# Patient Record
Sex: MALE | Race: WHITE | URBAN - METROPOLITAN AREA
[De-identification: names, ages, dates, MRNs, and addresses within clinical notes are randomized per-mention and may not be internally consistent; named-entity substitution may affect disease eponyms.]

---

## 2020-07-27 ENCOUNTER — IMPORTED ENCOUNTER (OUTPATIENT)
Dept: URBAN - METROPOLITAN AREA CLINIC 1 | Facility: CLINIC | Age: 65
End: 2020-07-27

## 2020-07-27 PROBLEM — H52.03: Noted: 2020-07-27

## 2020-07-27 PROBLEM — H52.4: Noted: 2020-07-27

## 2020-07-27 PROCEDURE — S0620 ROUTINE OPHTHALMOLOGICAL EXA: HCPCS

## 2020-07-27 NOTE — PATIENT DISCUSSION
1.  Hyperopia: Rx was given for corrective spectacles if indicated. 2.  Presbyopia 3. Cataract OU - Observe 4. Dry Eyes w/ PEK OU - ATs BID OU 5. Allergic Conjunctivitis OU - OTC Zaditor BID OU PRN for itchingReturn for an appointment in 1 year 36 with Dr. Toña Hendricks.

## 2021-08-23 ENCOUNTER — IMPORTED ENCOUNTER (OUTPATIENT)
Dept: URBAN - METROPOLITAN AREA CLINIC 1 | Facility: CLINIC | Age: 66
End: 2021-08-23

## 2021-08-23 PROBLEM — H02.834: Noted: 2021-08-23

## 2021-08-23 PROBLEM — H02.831: Noted: 2021-08-23

## 2021-08-23 PROBLEM — H04.123: Noted: 2021-08-23

## 2021-08-23 PROBLEM — H25.813: Noted: 2021-08-23

## 2021-08-23 PROCEDURE — 92014 COMPRE OPH EXAM EST PT 1/>: CPT

## 2021-08-23 NOTE — PATIENT DISCUSSION
1.  Cataract OU: Observe for now without intervention. The patient was advised to contact us if any change or worsening of vision2. Dry Eyes OU -The use/continuation of artificial tears were recommended. 3.  Dermatochalasis OU UL's  - will return for VF testing. 4. Return for an appointment for 1-2 weeks 10 VF( Ptosis taped and untaped) with Dr. Jake Sam.

## 2021-09-27 ENCOUNTER — IMPORTED ENCOUNTER (OUTPATIENT)
Dept: URBAN - METROPOLITAN AREA CLINIC 1 | Facility: CLINIC | Age: 66
End: 2021-09-27

## 2021-09-27 PROBLEM — H02.834: Noted: 2021-09-27

## 2021-09-27 PROBLEM — H02.831: Noted: 2021-09-27

## 2021-09-27 PROCEDURE — 99213 OFFICE O/P EST LOW 20 MIN: CPT

## 2021-09-27 NOTE — PATIENT DISCUSSION
1.  Dermatochalasis OU UL's -- Visually significant with reversible VF defect and patient desires superior bleph. Risks and benefits have been discussed with the patient. Ptosis VF shows visually significant reversible defects. 2.  Cataract OU -- Observe. 3.  CORIN w/ PEK OU -- Cont ATs TID OU routinely. 4.  Allergic Conjunctivitis OU -- Recommended OTC Pataday QD OU PRN itching. [address] for an appointment for [facility]) with Dr. Yolanda Sandoval.

## 2021-10-25 ENCOUNTER — IMPORTED ENCOUNTER (OUTPATIENT)
Dept: URBAN - METROPOLITAN AREA CLINIC 1 | Facility: CLINIC | Age: 66
End: 2021-10-25

## 2021-10-25 PROBLEM — H02.834: Noted: 2021-10-25

## 2021-10-25 PROBLEM — H02.831: Noted: 2021-10-25

## 2021-10-25 NOTE — PATIENT DISCUSSION
1.  Dermatochalasis OU UL's - Visually significant with reversible VF defect and patient desires to proceed with superior bleph. Lid Surgery Blepharoplasty Bilateral Upper Lid - pros cons alternatives and possible complications of Blepharoplasty Upper Lid discussed. OK OU Blepharoplasty Upper Lid. Return for an appointment for Return as scheduled with Dr. Jacqui Pearce.

## 2021-11-03 ENCOUNTER — IMPORTED ENCOUNTER (OUTPATIENT)
Dept: URBAN - METROPOLITAN AREA CLINIC 1 | Facility: CLINIC | Age: 66
End: 2021-11-03

## 2021-11-04 ENCOUNTER — IMPORTED ENCOUNTER (OUTPATIENT)
Dept: URBAN - METROPOLITAN AREA CLINIC 1 | Facility: CLINIC | Age: 66
End: 2021-11-04

## 2021-11-04 PROBLEM — Z09: Noted: 2021-11-04

## 2021-11-04 PROCEDURE — 99024 POSTOP FOLLOW-UP VISIT: CPT

## 2021-11-04 NOTE — PATIENT DISCUSSION
Superior Bleph POD#1 OU -- Pt doing well. Advised of Ice packs every 2 hours ten minutes at a time for first 2 days. Place a dry rag around ice pack when applying. Use Maxitrol Lc TID to sutures. Patient was also instructed to avoid rubbing the eye. Pt was advised to call if any problems. *Pt currently on minocycline as part of CA treatment. Return for an appointment as scheduled with Dr. Parmjit Peters.

## 2021-11-11 ENCOUNTER — IMPORTED ENCOUNTER (OUTPATIENT)
Dept: URBAN - METROPOLITAN AREA CLINIC 1 | Facility: CLINIC | Age: 66
End: 2021-11-11

## 2021-11-11 PROCEDURE — 99024 POSTOP FOLLOW-UP VISIT: CPT

## 2021-11-11 NOTE — PATIENT DISCUSSION
Post op Bleph 1 week OU -- Pt doing well. Continue Maxitrol Lc Qd to sutures per instruction given. Patient was also instructed to avoid rubbing the eye. Pt was advised to call if any problems. *Pt currently on minocycline as part of CA treatment. Return for an appointment in 6 months for a 30/glare with Dr. Ariel Boggs.

## 2022-04-02 ASSESSMENT — VISUAL ACUITY
OD_CC: J1+
OS_SC: 20/20
OD_SC: 20/20
OS_SC: 20/20
OD_SC: 20/20
OS_SC: 20/20
OD_CC: J1+
OD_SC: 20/20
OS_SC: 20/20
OD_SC: 20/20
OD_SC: 20/20
OS_SC: 20/20
OS_CC: J1+
OS_CC: J1+

## 2022-04-02 ASSESSMENT — TONOMETRY
OD_IOP_MMHG: 10
OD_IOP_MMHG: 11
OD_IOP_MMHG: 10
OS_IOP_MMHG: 10
OD_IOP_MMHG: 12
OS_IOP_MMHG: 12
OS_IOP_MMHG: 10
OS_IOP_MMHG: 12

## 2022-05-09 ENCOUNTER — COMPREHENSIVE EXAM (OUTPATIENT)
Dept: URBAN - METROPOLITAN AREA CLINIC 1 | Facility: CLINIC | Age: 67
End: 2022-05-09

## 2022-05-09 DIAGNOSIS — H18.413: ICD-10-CM

## 2022-05-09 DIAGNOSIS — H16.143: ICD-10-CM

## 2022-05-09 DIAGNOSIS — H25.813: ICD-10-CM

## 2022-05-09 DIAGNOSIS — H04.123: ICD-10-CM

## 2022-05-09 PROCEDURE — 92015 DETERMINE REFRACTIVE STATE: CPT

## 2022-05-09 PROCEDURE — 92014 COMPRE OPH EXAM EST PT 1/>: CPT

## 2022-05-09 ASSESSMENT — VISUAL ACUITY
OS_CC: 20/30
OD_BAT: 20/40
OD_CC: 20/25
OS_BAT: 20/40
OS_CC: J1+
OD_CC: J1+

## 2022-05-09 ASSESSMENT — TONOMETRY
OD_IOP_MMHG: 10
OS_IOP_MMHG: 10

## 2022-12-01 ENCOUNTER — COMPREHENSIVE EXAM (OUTPATIENT)
Dept: URBAN - METROPOLITAN AREA CLINIC 1 | Facility: CLINIC | Age: 67
End: 2022-12-01

## 2022-12-01 PROCEDURE — 92014 COMPRE OPH EXAM EST PT 1/>: CPT

## 2022-12-01 PROCEDURE — 92015 DETERMINE REFRACTIVE STATE: CPT

## 2022-12-01 ASSESSMENT — TONOMETRY
OD_IOP_MMHG: 11
OS_IOP_MMHG: 11

## 2022-12-01 ASSESSMENT — VISUAL ACUITY
OD_CC: J1+
OS_CC: J1+
OS_CC: 20/20
OD_CC: 20/20

## 2023-05-08 ENCOUNTER — COMPREHENSIVE EXAM (OUTPATIENT)
Dept: URBAN - METROPOLITAN AREA CLINIC 1 | Facility: CLINIC | Age: 68
End: 2023-05-08

## 2023-05-08 DIAGNOSIS — H43.813: ICD-10-CM

## 2023-05-08 DIAGNOSIS — H16.143: ICD-10-CM

## 2023-05-08 DIAGNOSIS — H25.813: ICD-10-CM

## 2023-05-08 DIAGNOSIS — H35.361: ICD-10-CM

## 2023-05-08 DIAGNOSIS — H18.413: ICD-10-CM

## 2023-05-08 DIAGNOSIS — H04.123: ICD-10-CM

## 2023-05-08 PROCEDURE — 92014 COMPRE OPH EXAM EST PT 1/>: CPT

## 2023-05-08 ASSESSMENT — VISUAL ACUITY
OS_CC: 20/20
OS_BAT: 20/40
OS_CC: J1+
OD_CC: 20/20
OD_BAT: 20/40
OD_CC: J1+

## 2023-05-08 ASSESSMENT — TONOMETRY
OD_IOP_MMHG: 12
OS_IOP_MMHG: 12

## 2024-05-09 ENCOUNTER — COMPREHENSIVE EXAM (OUTPATIENT)
Dept: URBAN - METROPOLITAN AREA CLINIC 1 | Facility: CLINIC | Age: 69
End: 2024-05-09

## 2024-05-09 DIAGNOSIS — H16.143: ICD-10-CM

## 2024-05-09 DIAGNOSIS — H04.123: ICD-10-CM

## 2024-05-09 DIAGNOSIS — H25.813: ICD-10-CM

## 2024-05-09 DIAGNOSIS — H43.813: ICD-10-CM

## 2024-05-09 DIAGNOSIS — H11.31: ICD-10-CM

## 2024-05-09 DIAGNOSIS — H18.413: ICD-10-CM

## 2024-05-09 DIAGNOSIS — H35.361: ICD-10-CM

## 2024-05-09 PROCEDURE — 92015 DETERMINE REFRACTIVE STATE: CPT

## 2024-05-09 PROCEDURE — 99214 OFFICE O/P EST MOD 30 MIN: CPT

## 2024-05-09 ASSESSMENT — TONOMETRY
OS_IOP_MMHG: 12
OD_IOP_MMHG: 12

## 2024-05-09 ASSESSMENT — VISUAL ACUITY
OS_CC: 20/20
OS_BAT: 20/40
OD_CC: 20/30
OS_CC: J1+
OD_BAT: 20/40
OD_CC: J1

## 2024-10-01 ENCOUNTER — PRE-OP/H&P (OUTPATIENT)
Dept: URBAN - METROPOLITAN AREA CLINIC 1 | Facility: CLINIC | Age: 69
End: 2024-10-01

## 2024-10-01 VITALS
DIASTOLIC BLOOD PRESSURE: 76 MMHG | HEIGHT: 72 IN | HEART RATE: 72 BPM | BODY MASS INDEX: 27.09 KG/M2 | WEIGHT: 200 LBS | SYSTOLIC BLOOD PRESSURE: 128 MMHG

## 2024-10-01 DIAGNOSIS — H25.813: ICD-10-CM

## 2024-10-01 PROCEDURE — 92136 OPHTHALMIC BIOMETRY: CPT

## 2024-10-01 PROCEDURE — 92025 CPTRIZED CORNEAL TOPOGRAPHY: CPT

## 2024-10-01 PROCEDURE — 99213 OFFICE O/P EST LOW 20 MIN: CPT

## 2024-10-09 ENCOUNTER — SURGERY/PROCEDURE (OUTPATIENT)
Dept: URBAN - METROPOLITAN AREA SURGERY 1 | Facility: SURGERY | Age: 69
End: 2024-10-09

## 2024-10-09 DIAGNOSIS — H25.812: ICD-10-CM

## 2024-10-09 PROCEDURE — 66984 XCAPSL CTRC RMVL W/O ECP: CPT

## 2024-10-10 ENCOUNTER — POST-OP (OUTPATIENT)
Dept: URBAN - METROPOLITAN AREA CLINIC 1 | Facility: CLINIC | Age: 69
End: 2024-10-10

## 2024-10-10 DIAGNOSIS — Z96.1: ICD-10-CM

## 2024-10-15 ENCOUNTER — POST OP/EVAL OF SECOND EYE (OUTPATIENT)
Dept: URBAN - METROPOLITAN AREA CLINIC 1 | Facility: CLINIC | Age: 69
End: 2024-10-15

## 2024-10-15 VITALS
DIASTOLIC BLOOD PRESSURE: 84 MMHG | BODY MASS INDEX: 26.01 KG/M2 | SYSTOLIC BLOOD PRESSURE: 126 MMHG | WEIGHT: 192 LBS | HEART RATE: 80 BPM | HEIGHT: 72 IN

## 2024-10-15 DIAGNOSIS — Z96.1: ICD-10-CM

## 2024-10-15 DIAGNOSIS — H25.811: ICD-10-CM

## 2024-10-15 PROCEDURE — 92025 CPTRIZED CORNEAL TOPOGRAPHY: CPT | Mod: NC

## 2024-10-15 PROCEDURE — 92136 OPHTHALMIC BIOMETRY: CPT | Mod: 26

## 2024-10-23 ENCOUNTER — SURGERY/PROCEDURE (OUTPATIENT)
Dept: URBAN - METROPOLITAN AREA SURGERY 1 | Facility: SURGERY | Age: 69
End: 2024-10-23

## 2024-10-23 DIAGNOSIS — H25.811: ICD-10-CM

## 2024-10-23 PROCEDURE — 66984 XCAPSL CTRC RMVL W/O ECP: CPT | Mod: 79,RT

## 2024-10-24 ENCOUNTER — POST-OP (OUTPATIENT)
Dept: URBAN - METROPOLITAN AREA CLINIC 1 | Facility: CLINIC | Age: 69
End: 2024-10-24

## 2024-10-24 DIAGNOSIS — Z96.1: ICD-10-CM

## 2024-11-25 ENCOUNTER — POST-OP (OUTPATIENT)
Dept: URBAN - METROPOLITAN AREA CLINIC 1 | Facility: CLINIC | Age: 69
End: 2024-11-25

## 2024-11-25 DIAGNOSIS — Z96.1: ICD-10-CM

## 2025-05-29 ENCOUNTER — COMPREHENSIVE EXAM (OUTPATIENT)
Age: 70
End: 2025-05-29

## 2025-05-29 DIAGNOSIS — H43.813: ICD-10-CM

## 2025-05-29 DIAGNOSIS — H04.123: ICD-10-CM

## 2025-05-29 DIAGNOSIS — H16.143: ICD-10-CM

## 2025-05-29 DIAGNOSIS — Z96.1: ICD-10-CM

## 2025-05-29 DIAGNOSIS — H18.413: ICD-10-CM

## 2025-05-29 DIAGNOSIS — H35.363: ICD-10-CM

## 2025-05-29 PROCEDURE — 92014 COMPRE OPH EXAM EST PT 1/>: CPT

## 2025-05-29 PROCEDURE — 92015 DETERMINE REFRACTIVE STATE: CPT
